# Patient Record
Sex: FEMALE | Race: WHITE | Employment: STUDENT | URBAN - METROPOLITAN AREA
[De-identification: names, ages, dates, MRNs, and addresses within clinical notes are randomized per-mention and may not be internally consistent; named-entity substitution may affect disease eponyms.]

---

## 2024-10-21 ENCOUNTER — OFFICE VISIT (OUTPATIENT)
Dept: OBGYN CLINIC | Age: 24
End: 2024-10-21
Payer: COMMERCIAL

## 2024-10-21 VITALS
DIASTOLIC BLOOD PRESSURE: 64 MMHG | HEIGHT: 68 IN | BODY MASS INDEX: 27.01 KG/M2 | SYSTOLIC BLOOD PRESSURE: 116 MMHG | WEIGHT: 178.2 LBS

## 2024-10-21 DIAGNOSIS — N92.6 IRREGULAR PERIODS/MENSTRUAL CYCLES: Primary | ICD-10-CM

## 2024-10-21 PROCEDURE — 99203 OFFICE O/P NEW LOW 30 MIN: CPT | Performed by: OBSTETRICS & GYNECOLOGY

## 2024-10-21 ASSESSMENT — PATIENT HEALTH QUESTIONNAIRE - PHQ9
1. LITTLE INTEREST OR PLEASURE IN DOING THINGS: NOT AT ALL
SUM OF ALL RESPONSES TO PHQ QUESTIONS 1-9: 0
SUM OF ALL RESPONSES TO PHQ QUESTIONS 1-9: 0
SUM OF ALL RESPONSES TO PHQ9 QUESTIONS 1 & 2: 0
SUM OF ALL RESPONSES TO PHQ QUESTIONS 1-9: 0
2. FEELING DOWN, DEPRESSED OR HOPELESS: NOT AT ALL
SUM OF ALL RESPONSES TO PHQ QUESTIONS 1-9: 0

## 2024-10-21 NOTE — PROGRESS NOTES
HPI:  Ms. De León is a 23 y.o. female  who is here today for a new patient visit. She does have frequent UTIs, has rx at [facility] at this time to  and start. Dx last night. Pt states that her periods are not monthly, not on birth control, long distance relationship. She did have normal period the last 2 months, longest that she has gone without a period is 1.5 months. She states that this is irregular and she does not know if she will have a period or not. Skipped May 2024 entirely.   Takes antibiotic and spironolactone for acne.     Menarche at age 15. Was on birth control from age [age], then periods were irregular off pills.        Date Performed Result   PAP Thinks around 1 year ago Wnl per pt   Mammogram never na   Colonoscopy never na   Dexa never na       OB History          0    Para   0    Term   0       0    AB   0    Living   0         SAB   0    IAB   0    Ectopic   0    Molar   0    Multiple   0    Live Births   0              GYN History     Patient's last menstrual period was 10/11/2024. Cycle Length irregular Lasting 6  - when review calendar  -really is monthly.    Positive dysmenorrhea; negative postcoital bleeding        Past Medical History:   Diagnosis Date    Allergies     allergy injections for trees/grass    Hypothyroidism     weaned off medicine around 1 year ago (10/21/24)     No past surgical history on file.    No Known Allergies    No current outpatient medications on file.     No current facility-administered medications for this visit.         Social History     Socioeconomic History    Marital status: Single - long distance relationship since age 19.        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     Tobacco Use    Smoking status: Never    Smokeless tobacco: Never   Vaping Use    Vaping